# Patient Record
Sex: FEMALE
[De-identification: names, ages, dates, MRNs, and addresses within clinical notes are randomized per-mention and may not be internally consistent; named-entity substitution may affect disease eponyms.]

---

## 2023-03-23 ENCOUNTER — NURSE TRIAGE (OUTPATIENT)
Dept: OTHER | Facility: CLINIC | Age: 43
End: 2023-03-23

## 2023-03-23 NOTE — TELEPHONE ENCOUNTER
[place] of [place]: [facility]    Location of employment: [address] [place] [address] where injury occurred: patient's home     Location of injury (body part involved): right arm     Time of injury: 3/23/2023 11:00     Last 4 of SSN: [id number]    Subjective: Caller states \"I went in for my admission visit. Family member opened the door and the dog was next to the family member. I reached out to pet the dog and it bit me over my jacket. \"     Current Symptoms: arm swelling at dog bite,  denies puncture     Pain Severity: mild pain     Temperature: None     What has been tried: nothing       Recommended disposition: Home Care    Employee injury packet sent to employee with listing of approved providers and locations. Employee aware they must be seen by one of the panel physicians, not their own PCP. Employee verbalizes understanding. Care advice provided, employee verbalizes understanding; denies any other questions or concerns; instructed to call back for any new or worsening symptoms. Reason for Disposition   Minor animal bite or claw wound, too small to irrigate    Protocols used:  Animal Bite-ADULT-[place]